# Patient Record
(demographics unavailable — no encounter records)

---

## 2025-03-05 NOTE — REASON FOR VISIT
[CV Risk Factors and Non-Cardiac Disease] : CV risk factors and non-cardiac disease [Hyperlipidemia] : hyperlipidemia [FreeTextEntry3] : DR. Dhillon [FreeTextEntry1] : Patient is a 59-year-old female with past medical history significant for HTN, HLD, murmur and smoking who presents for follow up cardiac evaluation. She is feeling well today. She states she had a sinus infection towards the end of December 2023 and just finished taking Azithromycin and Medrol dose pack. She also took her blood pressure at home yesterday and got two elevated readings 3 hours apart. She reports readings of 139/82 and 140/83. She does admit to being stressed at the time she took her blood pressure.   -Labs 7/7/23 demonstrated cholesterol 122, HDL 38, , LDL calc 48, LDL direct 56, A1c 5.7% -Echo done 4/3/23: LV EF 65%, minimal mitral regurgitation, minimal tricuspid regurgitation, sigmoid basal inter-ventricular septum -Exercise stress test 4/3/23 normal. -Labs 3/28/23 demonstrated total cholesterol 113, triglycerides 183, LDL 49, LDL direct 53, HDL 38, A1c 5.3% -CT heart calcium score done 3/21/23: Calculated Agatston score 0.  CRF: HTN, HLD, former smoker (1-2 cig/day, quit 6wks ago), family hx (HTN, HLD, aneurysm, triple bypass)

## 2025-03-05 NOTE — PHYSICAL EXAM
[Well Developed] : well developed [Well Nourished] : well nourished [No Acute Distress] : no acute distress [Normal Conjunctiva] : normal conjunctiva [Normal Venous Pressure] : normal venous pressure [No Carotid Bruit] : no carotid bruit [Normal S1, S2] : normal S1, S2 [No Rub] : no rub [5th Left ICS - MCL] : palpated at the 5th LICS in the midclavicular line [Normal] : normal [No Precordial Heave] : no precordial heave was noted [Normal Rate] : normal [Rhythm Regular] : regular [Normal S1] : normal S1 [Normal S2] : normal S2 [No Gallop] : no gallop heard [S3] : no S3 [S4] : no S4 [Click] : no click [Pericardial Rub] : no pericardial rub [I] : a grade 1 [No Pitting Edema] : no pitting edema present [Right Carotid Bruit] : no bruit heard over the right carotid [Left Carotid Bruit] : no bruit heard over the left carotid [Right Femoral Bruit] : no bruit heard over the right femoral artery [Left Femoral Bruit] : no bruit heard over the left femoral artery [2+] : left 2+ [No Abnormalities] : the abdominal aorta was not enlarged and no bruit was heard [Clear Lung Fields] : clear lung fields [Good Air Entry] : good air entry [No Respiratory Distress] : no respiratory distress  [Soft] : abdomen soft [Non Tender] : non-tender [No Masses/organomegaly] : no masses/organomegaly [Normal Bowel Sounds] : normal bowel sounds [Normal Gait] : normal gait [No Edema] : no edema [No Cyanosis] : no cyanosis [No Clubbing] : no clubbing [No Varicosities] : no varicosities [No Rash] : no rash [No Skin Lesions] : no skin lesions [Moves all extremities] : moves all extremities [No Focal Deficits] : no focal deficits [Normal Speech] : normal speech [Alert and Oriented] : alert and oriented [Normal memory] : normal memory

## 2025-03-05 NOTE — DISCUSSION/SUMMARY
[FreeTextEntry1] : This a 60-year-old female with past medical history significant for hypertension, hyperlipidemia, murmur, who comes in for cardiac follow-up evaluation. She denies chest pain, shortness of breath, dizziness or syncope.  She may get occasional dyspnea on exertion.  She has no history of rheumatic fever.  She does not drink excessive caffeine or alcohol. Cardiac risk factors include hypertension, hyperlipidemia, and history of smoking. The patient had a normal exercise stress test March 5, 2025. Lipid panel done November 13, 2024 demonstrated cholesterol 143, HDL 48, triglycerides 158, LDL cholesterol 71 mg/dL direct LDL cholesterol 73 mg/dL and non-HDL cholesterol 95 mg/dL, lipoprotein B70 5 mg/dL, hemoglobin A1c 5.6. The patient has been stable on her current dose of Crestor 10 mg daily.  She will continue on her current diet and exercise program. She will follow-up with her primary care physician. Electrocardiogram done August 29, 2024 demonstrated sinus bradycardia at a rate of 56 bpm is otherwise remarkable poor R wave progression and first-degree atrioventricular block. The patient is currently taking atenolol 50 mg daily (down from 100 mg dosage), and telmisartan hydrochlorothiazide 40/12.5 mg each morning.  She has asymptomatic benign first-degree AV atrioventricular block and will continue on her current dose of atenolol for now. Lipid panel done March 13, 2024 demonstrated cholesterol 136, HDL 45, LDL calculated 69, triglycerides 179, hemoglobin A1c of 5.4.  And potassium 4.3. Her blood pressure is under good control. Electrocardiogram done January 9, 2024 demonstrated normal sinus rhythm rate 64 bpm is otherwise remarkable for poor R wave progression, and is otherwise remarkable for first-degree atrioventricular block. Echo Doppler examination done April 3, 2023 demonstrated normal left ventricular ejection fraction of 65%, minimal mitral valve regurgitation, minimal tricuspid valve regurgitation sigmoid basal interventricular septum.  Lipid panel done July 7, 2023 demonstrated a cholesterol 122, HDL 38, triglycerides 182,  LDL direct 56 mg/dL, and non-HDL cholesterol 85 mg/dL with hemoglobin A1c of 5.7.  Electrocardiogram done July 7, 2023 demonstrated sinus bradycardia rate of 54 bpm is otherwise remarkable for first-degree atrioventricular block. Coronary artery calcium score done March 21, 2023 demonstrated 0 calcium score Echo Doppler examination done April 3, 2023 demonstrated normal left ventricular ejection fraction of 65% with minimal mitral and minimal tricuspid valve regurgitation and sigmoid basal interventricular septum. The patient reports that her blood pressure has been elevated in her primary care physician's office.  She takes her blood pressure at home and its within normal limits. At this point in time she is on atenolol 100 mg daily which may be contributing to her first-degree atrioventricular block, and hydrochlorothiazide 12.5 mg/day.    The patient had a normal exercise stress test April 3, 2023. The patient had a coronary artery calcium score done March 21, 2023 which was within normal limits-0 score. Lipid profile done March 28, 2023 demonstrated cholesterol 113, HDL 38, triglyceride 183, and LDL direct of 53 mg/dL with hemoglobin A1c of 5.3 and slight increase in ALT of 32 units/L with a normal AST. She will continue on her current medical therapy.  I have asked and increase her aerobic activity to 40 minutes 4 times per week. The patient had an echo Doppler examination April 3, 2023, results of which are still pending. She has been monitoring her home blood pressure at home and has been getting excellent results. Electrocardiogram done February 7, 2023 demonstrated normal sinus rhythm rate 63 bpm is otherwise remarkable for poor R wave progression. Electrocardiogram for review February 2, 2023 demonstrate normal sinus rhythm rate 64 bpm and is otherwise remarkable for R wave progression.  Lipid profile done February 2, 2023 demonstrated a cholesterol of 172, HDL of 40, , triglycerides of 213 mg/dL. Blood work done May 20, 2021 demonstrated hemoglobin A1c of 5.3, cholesterol 184, HDL of 47, LDL 99, triglycerides 182 mg/dL. Lifestyle modification including regular aerobic exercise was reinforced. I will discuss with the patient the role of our registered dietitian and her overall care.  The patient understands that aerobic exercises must be increased to 40 minutes 4 times per week. A detailed discussion of lifestyle modification was done today. The patient has a good understanding of the diagnosis, and treatment plan. Lifestyle modification was also outlined.  Thank you for allowing participate in the care of your patient.  Please do not hesitate to call if you have any questions.